# Patient Record
Sex: FEMALE | Race: WHITE | NOT HISPANIC OR LATINO | URBAN - METROPOLITAN AREA
[De-identification: names, ages, dates, MRNs, and addresses within clinical notes are randomized per-mention and may not be internally consistent; named-entity substitution may affect disease eponyms.]

---

## 2017-08-22 ENCOUNTER — OUTPATIENT (OUTPATIENT)
Dept: OUTPATIENT SERVICES | Facility: HOSPITAL | Age: 52
LOS: 1 days | End: 2017-08-22
Payer: COMMERCIAL

## 2017-08-22 VITALS
WEIGHT: 123.9 LBS | DIASTOLIC BLOOD PRESSURE: 74 MMHG | HEART RATE: 64 BPM | RESPIRATION RATE: 16 BRPM | SYSTOLIC BLOOD PRESSURE: 124 MMHG | TEMPERATURE: 98 F

## 2017-08-22 DIAGNOSIS — Z98.890 OTHER SPECIFIED POSTPROCEDURAL STATES: Chronic | ICD-10-CM

## 2017-08-22 DIAGNOSIS — Z01.818 ENCOUNTER FOR OTHER PREPROCEDURAL EXAMINATION: ICD-10-CM

## 2017-08-22 DIAGNOSIS — Z98.82 BREAST IMPLANT STATUS: Chronic | ICD-10-CM

## 2017-08-22 DIAGNOSIS — K40.90 UNILATERAL INGUINAL HERNIA, WITHOUT OBSTRUCTION OR GANGRENE, NOT SPECIFIED AS RECURRENT: ICD-10-CM

## 2017-08-22 LAB
ALBUMIN SERPL ELPH-MCNC: 3.8 G/DL — SIGNIFICANT CHANGE UP (ref 3.3–5)
ALP SERPL-CCNC: 50 U/L — SIGNIFICANT CHANGE UP (ref 40–120)
ALT FLD-CCNC: 21 U/L — SIGNIFICANT CHANGE UP (ref 12–78)
ANION GAP SERPL CALC-SCNC: 9 MMOL/L — SIGNIFICANT CHANGE UP (ref 5–17)
AST SERPL-CCNC: 16 U/L — SIGNIFICANT CHANGE UP (ref 15–37)
BILIRUB SERPL-MCNC: 0.6 MG/DL — SIGNIFICANT CHANGE UP (ref 0.2–1.2)
BUN SERPL-MCNC: 16 MG/DL — SIGNIFICANT CHANGE UP (ref 7–23)
CALCIUM SERPL-MCNC: 8.7 MG/DL — SIGNIFICANT CHANGE UP (ref 8.5–10.1)
CHLORIDE SERPL-SCNC: 107 MMOL/L — SIGNIFICANT CHANGE UP (ref 96–108)
CO2 SERPL-SCNC: 24 MMOL/L — SIGNIFICANT CHANGE UP (ref 22–31)
CREAT SERPL-MCNC: 0.8 MG/DL — SIGNIFICANT CHANGE UP (ref 0.5–1.3)
GLUCOSE SERPL-MCNC: 107 MG/DL — HIGH (ref 70–99)
HCG UR QL: NEGATIVE — SIGNIFICANT CHANGE UP
HCT VFR BLD CALC: 39.6 % — SIGNIFICANT CHANGE UP (ref 34.5–45)
HGB BLD-MCNC: 12.9 G/DL — SIGNIFICANT CHANGE UP (ref 11.5–15.5)
MCHC RBC-ENTMCNC: 29.9 PG — SIGNIFICANT CHANGE UP (ref 27–34)
MCHC RBC-ENTMCNC: 32.6 GM/DL — SIGNIFICANT CHANGE UP (ref 32–36)
MCV RBC AUTO: 91.8 FL — SIGNIFICANT CHANGE UP (ref 80–100)
PLATELET # BLD AUTO: 255 K/UL — SIGNIFICANT CHANGE UP (ref 150–400)
POTASSIUM SERPL-MCNC: 3.7 MMOL/L — SIGNIFICANT CHANGE UP (ref 3.5–5.3)
POTASSIUM SERPL-SCNC: 3.7 MMOL/L — SIGNIFICANT CHANGE UP (ref 3.5–5.3)
PROT SERPL-MCNC: 6.9 G/DL — SIGNIFICANT CHANGE UP (ref 6–8.3)
RBC # BLD: 4.32 M/UL — SIGNIFICANT CHANGE UP (ref 3.8–5.2)
RBC # FLD: 12.5 % — SIGNIFICANT CHANGE UP (ref 10.3–14.5)
SODIUM SERPL-SCNC: 140 MMOL/L — SIGNIFICANT CHANGE UP (ref 135–145)
WBC # BLD: 6.2 K/UL — SIGNIFICANT CHANGE UP (ref 3.8–10.5)
WBC # FLD AUTO: 6.2 K/UL — SIGNIFICANT CHANGE UP (ref 3.8–10.5)

## 2017-08-22 PROCEDURE — G0463: CPT

## 2017-08-22 PROCEDURE — 36415 COLL VENOUS BLD VENIPUNCTURE: CPT

## 2017-08-22 PROCEDURE — 80053 COMPREHEN METABOLIC PANEL: CPT

## 2017-08-22 PROCEDURE — 85027 COMPLETE CBC AUTOMATED: CPT

## 2017-08-22 PROCEDURE — 81025 URINE PREGNANCY TEST: CPT

## 2017-08-22 PROCEDURE — 81001 URINALYSIS AUTO W/SCOPE: CPT

## 2017-08-22 PROCEDURE — 86850 RBC ANTIBODY SCREEN: CPT

## 2017-08-22 PROCEDURE — 86900 BLOOD TYPING SEROLOGIC ABO: CPT

## 2017-08-22 PROCEDURE — 86901 BLOOD TYPING SEROLOGIC RH(D): CPT

## 2017-08-22 NOTE — H&P PST ADULT - PMH
Asthma    Essential tremor  (Right hand, on Topiramate)  GERD (gastroesophageal reflux disease)    Seasonal allergies    Unilateral inguinal hernia

## 2017-08-22 NOTE — H&P PST ADULT - LYMPHATIC
supraclavicular R/anterior cervical L/posterior cervical L/anterior cervical R/posterior cervical R/supraclavicular L

## 2017-08-22 NOTE — H&P PST ADULT - NSANTHOSAYNRD_GEN_A_CORE
No. DERIAN screening performed.  STOP BANG Legend: 0-2 = LOW Risk; 3-4 = INTERMEDIATE Risk; 5-8 = HIGH Risk

## 2017-08-22 NOTE — H&P PST ADULT - GASTROINTESTINAL DETAILS
nontender/no masses palpable/normal/bowel sounds normal/no organomegaly/no distention/soft/no guarding/no rigidity/no rebound tenderness/no bruit

## 2017-08-22 NOTE — H&P PST ADULT - HISTORY OF PRESENT ILLNESS
51yo female with PMH of asthma here for PST. Pt reports to noticing "lump to right groin and it has increased in size". Pt diagnosed with right inguinal hernia. Pt denies n/v/d and abdominal pain. Pt electing for laparoscopic right inguinal hernia repair on 8/24/17.

## 2017-08-22 NOTE — H&P PST ADULT - RS GEN PE MLT RESP DETAILS PC
airway patent/good air movement/clear to auscultation bilaterally/normal/respirations non-labored/breath sounds equal

## 2017-08-22 NOTE — H&P PST ADULT - PSH
S/P bilateral breast implants  (Saline implants, )  S/P dilatation and curettage  (Missed )  S/P hernia repair  (Umbilical hernia reapir, ) S/P bilateral breast implants  (Saline implants, )  S/P dilatation and curettage  (Missed  at 20 weeks)  S/P hernia repair  (Umbilical hernia reapir, )

## 2017-08-22 NOTE — H&P PST ADULT - NEGATIVE CARDIOVASCULAR SYMPTOMS
no peripheral edema/no palpitations/no paroxysmal nocturnal dyspnea/no claudication/no chest pain/no dyspnea on exertion/no orthopnea

## 2017-08-23 RX ORDER — SODIUM CHLORIDE 9 MG/ML
1000 INJECTION, SOLUTION INTRAVENOUS
Qty: 0 | Refills: 0 | Status: DISCONTINUED | OUTPATIENT
Start: 2017-08-24 | End: 2017-08-24

## 2017-08-24 ENCOUNTER — OUTPATIENT (OUTPATIENT)
Dept: OUTPATIENT SERVICES | Facility: HOSPITAL | Age: 52
LOS: 1 days | Discharge: HOME | End: 2017-08-24
Payer: COMMERCIAL

## 2017-08-24 ENCOUNTER — TRANSCRIPTION ENCOUNTER (OUTPATIENT)
Age: 52
End: 2017-08-24

## 2017-08-24 VITALS
HEIGHT: 62 IN | RESPIRATION RATE: 14 BRPM | TEMPERATURE: 98 F | HEART RATE: 67 BPM | WEIGHT: 123.9 LBS | DIASTOLIC BLOOD PRESSURE: 78 MMHG | SYSTOLIC BLOOD PRESSURE: 135 MMHG

## 2017-08-24 VITALS
RESPIRATION RATE: 13 BRPM | OXYGEN SATURATION: 97 % | SYSTOLIC BLOOD PRESSURE: 113 MMHG | DIASTOLIC BLOOD PRESSURE: 52 MMHG

## 2017-08-24 DIAGNOSIS — Z98.890 OTHER SPECIFIED POSTPROCEDURAL STATES: Chronic | ICD-10-CM

## 2017-08-24 DIAGNOSIS — K40.90 UNILATERAL INGUINAL HERNIA, WITHOUT OBSTRUCTION OR GANGRENE, NOT SPECIFIED AS RECURRENT: ICD-10-CM

## 2017-08-24 DIAGNOSIS — Z98.82 BREAST IMPLANT STATUS: Chronic | ICD-10-CM

## 2017-08-24 DIAGNOSIS — Z01.818 ENCOUNTER FOR OTHER PREPROCEDURAL EXAMINATION: ICD-10-CM

## 2017-08-24 PROCEDURE — C1781: CPT

## 2017-08-24 PROCEDURE — 49650 LAP ING HERNIA REPAIR INIT: CPT | Mod: RT

## 2017-08-24 RX ORDER — SODIUM CHLORIDE 9 MG/ML
1000 INJECTION, SOLUTION INTRAVENOUS
Qty: 0 | Refills: 0 | Status: DISCONTINUED | OUTPATIENT
Start: 2017-08-24 | End: 2017-08-24

## 2017-08-24 RX ORDER — FLUTICASONE PROPIONATE 50 MCG
1 SPRAY, SUSPENSION NASAL
Qty: 0 | Refills: 0 | COMMUNITY

## 2017-08-24 RX ORDER — CEFAZOLIN SODIUM 1 G
2000 VIAL (EA) INJECTION ONCE
Qty: 0 | Refills: 0 | Status: COMPLETED | OUTPATIENT
Start: 2017-08-24 | End: 2017-08-24

## 2017-08-24 RX ORDER — OXYCODONE HYDROCHLORIDE 5 MG/1
10 TABLET ORAL EVERY 6 HOURS
Qty: 0 | Refills: 0 | Status: DISCONTINUED | OUTPATIENT
Start: 2017-08-24 | End: 2017-08-24

## 2017-08-24 RX ORDER — CEPHALEXIN 500 MG
1 CAPSULE ORAL
Qty: 2 | Refills: 0 | OUTPATIENT
Start: 2017-08-24

## 2017-08-24 RX ORDER — OXYCODONE HYDROCHLORIDE 5 MG/1
5 TABLET ORAL EVERY 4 HOURS
Qty: 0 | Refills: 0 | Status: DISCONTINUED | OUTPATIENT
Start: 2017-08-24 | End: 2017-08-24

## 2017-08-24 RX ORDER — HYDROMORPHONE HYDROCHLORIDE 2 MG/ML
0.5 INJECTION INTRAMUSCULAR; INTRAVENOUS; SUBCUTANEOUS
Qty: 0 | Refills: 0 | Status: DISCONTINUED | OUTPATIENT
Start: 2017-08-24 | End: 2017-08-24

## 2017-08-24 RX ORDER — IBUPROFEN 200 MG
1 TABLET ORAL
Qty: 0 | Refills: 0 | COMMUNITY

## 2017-08-24 RX ORDER — FEXOFENADINE HCL 30 MG
1 TABLET ORAL
Qty: 0 | Refills: 0 | COMMUNITY

## 2017-08-24 RX ORDER — AZELASTINE 137 UG/1
2 SPRAY, METERED NASAL
Qty: 0 | Refills: 0 | COMMUNITY

## 2017-08-24 RX ORDER — OMEPRAZOLE 10 MG/1
1 CAPSULE, DELAYED RELEASE ORAL
Qty: 0 | Refills: 0 | COMMUNITY

## 2017-08-24 RX ORDER — MONTELUKAST 4 MG/1
1 TABLET, CHEWABLE ORAL
Qty: 0 | Refills: 0 | COMMUNITY

## 2017-08-24 RX ORDER — TOPIRAMATE 25 MG
0 TABLET ORAL
Qty: 0 | Refills: 0 | COMMUNITY

## 2017-08-24 RX ORDER — ONDANSETRON 8 MG/1
1 TABLET, FILM COATED ORAL
Qty: 30 | Refills: 0 | OUTPATIENT
Start: 2017-08-24

## 2017-08-24 RX ORDER — ONDANSETRON 8 MG/1
4 TABLET, FILM COATED ORAL ONCE
Qty: 0 | Refills: 0 | Status: DISCONTINUED | OUTPATIENT
Start: 2017-08-24 | End: 2017-08-24

## 2017-08-24 RX ORDER — GLUCOSAMINE/MSM/CHONDROITIN A 750-375MG
0 TABLET ORAL
Qty: 0 | Refills: 0 | COMMUNITY

## 2017-08-24 RX ORDER — DOCUSATE SODIUM 100 MG
1 CAPSULE ORAL
Qty: 20 | Refills: 0 | OUTPATIENT
Start: 2017-08-24

## 2017-08-24 RX ADMIN — SODIUM CHLORIDE 75 MILLILITER(S): 9 INJECTION, SOLUTION INTRAVENOUS at 14:23

## 2017-08-24 RX ADMIN — HYDROMORPHONE HYDROCHLORIDE 0.5 MILLIGRAM(S): 2 INJECTION INTRAMUSCULAR; INTRAVENOUS; SUBCUTANEOUS at 14:43

## 2017-08-24 RX ADMIN — HYDROMORPHONE HYDROCHLORIDE 0.5 MILLIGRAM(S): 2 INJECTION INTRAMUSCULAR; INTRAVENOUS; SUBCUTANEOUS at 14:22

## 2017-08-24 RX ADMIN — HYDROMORPHONE HYDROCHLORIDE 0.5 MILLIGRAM(S): 2 INJECTION INTRAMUSCULAR; INTRAVENOUS; SUBCUTANEOUS at 15:00

## 2017-08-24 RX ADMIN — SODIUM CHLORIDE 50 MILLILITER(S): 9 INJECTION, SOLUTION INTRAVENOUS at 10:44

## 2017-08-24 NOTE — ASU PATIENT PROFILE, ADULT - PSH
S/P bilateral breast implants  (Saline implants, )  S/P dilatation and curettage  (Missed  at 20 weeks)  S/P hernia repair  (Umbilical hernia reapir, )

## 2017-08-24 NOTE — ASU DISCHARGE PLAN (ADULT/PEDIATRIC). - SPECIAL INSTRUCTIONS
The Patient was given access to the following documents on Aug 24, 2017     PREVENTING INFECTIONS  - Ambulatory Care,  English  The Patient was given access to the following documents on Aug 24, 2017     Oxycodone/Acetaminophen (Oral) (Capsule, Liquid, Tablet, Tablet, Extended Release)  - DrugNote,  English   Oxycodone/Acetaminophen (Oral) (Capsule, Liquid, Tablet, Tablet, Extended Release)  - Med Essential Fact Sheet,  English   The Patient was given access to the following documents on Aug 24, 2017     Laxative, Stool Softeners (Oral) (Capsule, Tablet, Liquid, Capsule, Liquid Filled)  - DrugNote,  English   Laxative, Stool Softeners (Oral) (Capsule, Tablet, Liquid, Capsule, Liquid Filled)  - Med Essential Fact Sheet,  English   The Patient was given access to the following documents on Aug 24, 2017     Cephalexin (Oral) (Capsule, Tablet, Tablet for Suspension, Liquid)  - DrugNote,  English   Cephalexin (Oral) (Capsule, Tablet, Tablet for Suspension, Liquid)  - Med Essential Fact Sheet,  English   The Patient was given access to the following documents on Aug 24, 2017     INGUINAL HERNIA REPAIR  - Discharge Care,  English

## 2017-08-24 NOTE — ASU DISCHARGE PLAN (ADULT/PEDIATRIC). - MEDICATION SUMMARY - MEDICATIONS TO TAKE
I will START or STAY ON the medications listed below when I get home from the hospital:    oxyCODONE-acetaminophen 10 mg-325 mg oral tablet  -- 1 tab(s) by mouth every 6 hours MDD:4  -- Caution federal law prohibits the transfer of this drug to any person other  than the person for whom it was prescribed.  May cause drowsiness.  Alcohol may intensify this effect.  Use care when operating dangerous machinery.  This prescription cannot be refilled.  This product contains acetaminophen.  Do not use  with any other product containing acetaminophen to prevent possible liver damage.  Using more of this medication than prescribed may cause serious breathing problems.    -- Indication: For pain control    topiramate 25 mg oral capsule  --  by mouth 4 times a day (50mg at 6am, 25mg at 12 noon & 25mg at bedtime)  -- Indication: For home med    Zofran ODT 8 mg oral tablet, disintegrating  -- 1 tab(s) by mouth every 6 hours  -- Indication: For nausea    Allegra 180 mg oral tablet  -- 1 tab(s) by mouth once a day  -- Indication: For home med    Mucinex Allergy 180 mg oral tablet  -- 1 tab(s) by mouth once a day  -- Indication: For home med    Keflex 500 mg oral capsule  -- 1 cap(s) by mouth 2 times a day  -- Finish all this medication unless otherwise directed by prescriber.    -- Indication: For antibiotic    Colace 100 mg oral capsule  -- 1 cap(s) by mouth 3 times a day  -- Medication should be taken with plenty of water.    -- Indication: For constipation    montelukast 10 mg oral tablet  -- 1 tab(s) by mouth once a day  -- Indication: For home med    Flonase 50 mcg/inh nasal spray  -- 1 spray(s) into nose once a day  -- Indication: For home med    azelastine 137 mcg/inh (0.1%) nasal spray  -- 2 spray(s) into nose 2 times a day  -- Indication: For home med    Glucosamine & Chondroitin with MSM  --   once a day  -- Indication: For home med    omeprazole 40 mg oral delayed release capsule  -- 1 cap(s) by mouth once a day  -- Indication: For home med

## 2017-08-27 DIAGNOSIS — K40.90 UNILATERAL INGUINAL HERNIA, WITHOUT OBSTRUCTION OR GANGRENE, NOT SPECIFIED AS RECURRENT: ICD-10-CM

## 2017-08-27 DIAGNOSIS — Z88.3 ALLERGY STATUS TO OTHER ANTI-INFECTIVE AGENTS: ICD-10-CM

## 2017-08-27 DIAGNOSIS — J45.909 UNSPECIFIED ASTHMA, UNCOMPLICATED: ICD-10-CM

## 2018-07-16 PROBLEM — G25.0 ESSENTIAL TREMOR: Chronic | Status: ACTIVE | Noted: 2017-08-22

## 2019-07-24 ENCOUNTER — RESULT REVIEW (OUTPATIENT)
Age: 54
End: 2019-07-24

## 2020-10-06 ENCOUNTER — RESULT REVIEW (OUTPATIENT)
Age: 55
End: 2020-10-06

## 2022-10-29 ENCOUNTER — EMERGENCY (EMERGENCY)
Facility: HOSPITAL | Age: 57
LOS: 1 days | Discharge: HOME | End: 2022-10-29
Attending: EMERGENCY MEDICINE | Admitting: EMERGENCY MEDICINE
Payer: COMMERCIAL

## 2022-10-29 VITALS
TEMPERATURE: 98 F | HEIGHT: 62 IN | DIASTOLIC BLOOD PRESSURE: 66 MMHG | RESPIRATION RATE: 16 BRPM | HEART RATE: 85 BPM | OXYGEN SATURATION: 98 % | WEIGHT: 138.01 LBS | SYSTOLIC BLOOD PRESSURE: 111 MMHG

## 2022-10-29 VITALS
DIASTOLIC BLOOD PRESSURE: 64 MMHG | SYSTOLIC BLOOD PRESSURE: 122 MMHG | OXYGEN SATURATION: 98 % | TEMPERATURE: 98 F | RESPIRATION RATE: 16 BRPM | HEART RATE: 85 BPM

## 2022-10-29 DIAGNOSIS — Z98.890 OTHER SPECIFIED POSTPROCEDURAL STATES: Chronic | ICD-10-CM

## 2022-10-29 DIAGNOSIS — Z98.82 BREAST IMPLANT STATUS: Chronic | ICD-10-CM

## 2022-10-29 PROBLEM — J45.909 UNSPECIFIED ASTHMA, UNCOMPLICATED: Chronic | Status: ACTIVE | Noted: 2017-08-22

## 2022-10-29 PROBLEM — K40.90 UNILATERAL INGUINAL HERNIA, WITHOUT OBSTRUCTION OR GANGRENE, NOT SPECIFIED AS RECURRENT: Chronic | Status: ACTIVE | Noted: 2017-08-22

## 2022-10-29 PROBLEM — J30.2 OTHER SEASONAL ALLERGIC RHINITIS: Chronic | Status: ACTIVE | Noted: 2017-08-22

## 2022-10-29 PROBLEM — K21.9 GASTRO-ESOPHAGEAL REFLUX DISEASE WITHOUT ESOPHAGITIS: Chronic | Status: ACTIVE | Noted: 2017-08-22

## 2022-10-29 LAB
ALBUMIN SERPL ELPH-MCNC: 3.6 G/DL — SIGNIFICANT CHANGE UP (ref 3.3–5)
ALP SERPL-CCNC: 81 U/L — SIGNIFICANT CHANGE UP (ref 40–120)
ALT FLD-CCNC: 26 U/L — SIGNIFICANT CHANGE UP (ref 12–78)
ANION GAP SERPL CALC-SCNC: 8 MMOL/L — SIGNIFICANT CHANGE UP (ref 5–17)
APPEARANCE UR: CLEAR — SIGNIFICANT CHANGE UP
AST SERPL-CCNC: 20 U/L — SIGNIFICANT CHANGE UP (ref 15–37)
BASOPHILS # BLD AUTO: 0.04 K/UL — SIGNIFICANT CHANGE UP (ref 0–0.2)
BASOPHILS NFR BLD AUTO: 0.3 % — SIGNIFICANT CHANGE UP (ref 0–2)
BILIRUB SERPL-MCNC: 0.4 MG/DL — SIGNIFICANT CHANGE UP (ref 0.2–1.2)
BILIRUB UR-MCNC: NEGATIVE — SIGNIFICANT CHANGE UP
BUN SERPL-MCNC: 10 MG/DL — SIGNIFICANT CHANGE UP (ref 7–23)
CALCIUM SERPL-MCNC: 9.2 MG/DL — SIGNIFICANT CHANGE UP (ref 8.5–10.1)
CHLORIDE SERPL-SCNC: 102 MMOL/L — SIGNIFICANT CHANGE UP (ref 96–108)
CO2 SERPL-SCNC: 26 MMOL/L — SIGNIFICANT CHANGE UP (ref 22–31)
COLOR SPEC: SIGNIFICANT CHANGE UP
CREAT SERPL-MCNC: 0.77 MG/DL — SIGNIFICANT CHANGE UP (ref 0.5–1.3)
DIFF PNL FLD: ABNORMAL
EGFR: 91 ML/MIN/1.73M2 — SIGNIFICANT CHANGE UP
EOSINOPHIL # BLD AUTO: 0.04 K/UL — SIGNIFICANT CHANGE UP (ref 0–0.5)
EOSINOPHIL NFR BLD AUTO: 0.3 % — SIGNIFICANT CHANGE UP (ref 0–6)
GLUCOSE SERPL-MCNC: 148 MG/DL — HIGH (ref 70–99)
GLUCOSE UR QL: NEGATIVE — SIGNIFICANT CHANGE UP
HCG SERPL-ACNC: 1 MIU/ML — SIGNIFICANT CHANGE UP
HCT VFR BLD CALC: 39 % — SIGNIFICANT CHANGE UP (ref 34.5–45)
HGB BLD-MCNC: 13 G/DL — SIGNIFICANT CHANGE UP (ref 11.5–15.5)
IMM GRANULOCYTES NFR BLD AUTO: 0.2 % — SIGNIFICANT CHANGE UP (ref 0–0.9)
KETONES UR-MCNC: NEGATIVE — SIGNIFICANT CHANGE UP
LEUKOCYTE ESTERASE UR-ACNC: NEGATIVE — SIGNIFICANT CHANGE UP
LIDOCAIN IGE QN: 130 U/L — SIGNIFICANT CHANGE UP (ref 73–393)
LYMPHOCYTES # BLD AUTO: 1.72 K/UL — SIGNIFICANT CHANGE UP (ref 1–3.3)
LYMPHOCYTES # BLD AUTO: 14.2 % — SIGNIFICANT CHANGE UP (ref 13–44)
MCHC RBC-ENTMCNC: 29.3 PG — SIGNIFICANT CHANGE UP (ref 27–34)
MCHC RBC-ENTMCNC: 33.3 GM/DL — SIGNIFICANT CHANGE UP (ref 32–36)
MCV RBC AUTO: 88 FL — SIGNIFICANT CHANGE UP (ref 80–100)
MONOCYTES # BLD AUTO: 1.11 K/UL — HIGH (ref 0–0.9)
MONOCYTES NFR BLD AUTO: 9.1 % — SIGNIFICANT CHANGE UP (ref 2–14)
NEUTROPHILS # BLD AUTO: 9.22 K/UL — HIGH (ref 1.8–7.4)
NEUTROPHILS NFR BLD AUTO: 75.9 % — SIGNIFICANT CHANGE UP (ref 43–77)
NITRITE UR-MCNC: NEGATIVE — SIGNIFICANT CHANGE UP
NRBC # BLD: 0 /100 WBCS — SIGNIFICANT CHANGE UP (ref 0–0)
PH UR: 8 — SIGNIFICANT CHANGE UP (ref 5–8)
PLATELET # BLD AUTO: 264 K/UL — SIGNIFICANT CHANGE UP (ref 150–400)
POTASSIUM SERPL-MCNC: 3.8 MMOL/L — SIGNIFICANT CHANGE UP (ref 3.5–5.3)
POTASSIUM SERPL-SCNC: 3.8 MMOL/L — SIGNIFICANT CHANGE UP (ref 3.5–5.3)
PROT SERPL-MCNC: 7.8 G/DL — SIGNIFICANT CHANGE UP (ref 6–8.3)
PROT UR-MCNC: NEGATIVE — SIGNIFICANT CHANGE UP
RBC # BLD: 4.43 M/UL — SIGNIFICANT CHANGE UP (ref 3.8–5.2)
RBC # FLD: 12.5 % — SIGNIFICANT CHANGE UP (ref 10.3–14.5)
SODIUM SERPL-SCNC: 136 MMOL/L — SIGNIFICANT CHANGE UP (ref 135–145)
SP GR SPEC: 1.01 — SIGNIFICANT CHANGE UP (ref 1.01–1.02)
UROBILINOGEN FLD QL: NEGATIVE — SIGNIFICANT CHANGE UP
WBC # BLD: 12.15 K/UL — HIGH (ref 3.8–10.5)
WBC # FLD AUTO: 12.15 K/UL — HIGH (ref 3.8–10.5)

## 2022-10-29 PROCEDURE — 96374 THER/PROPH/DIAG INJ IV PUSH: CPT

## 2022-10-29 PROCEDURE — 80053 COMPREHEN METABOLIC PANEL: CPT

## 2022-10-29 PROCEDURE — 84702 CHORIONIC GONADOTROPIN TEST: CPT

## 2022-10-29 PROCEDURE — 99284 EMERGENCY DEPT VISIT MOD MDM: CPT

## 2022-10-29 PROCEDURE — 85025 COMPLETE CBC W/AUTO DIFF WBC: CPT

## 2022-10-29 PROCEDURE — 81001 URINALYSIS AUTO W/SCOPE: CPT

## 2022-10-29 PROCEDURE — 83690 ASSAY OF LIPASE: CPT

## 2022-10-29 PROCEDURE — 36415 COLL VENOUS BLD VENIPUNCTURE: CPT

## 2022-10-29 PROCEDURE — 99284 EMERGENCY DEPT VISIT MOD MDM: CPT | Mod: 25

## 2022-10-29 RX ORDER — SODIUM CHLORIDE 9 MG/ML
1000 INJECTION INTRAMUSCULAR; INTRAVENOUS; SUBCUTANEOUS ONCE
Refills: 0 | Status: COMPLETED | OUTPATIENT
Start: 2022-10-29 | End: 2022-10-29

## 2022-10-29 RX ORDER — METRONIDAZOLE 500 MG
1 TABLET ORAL
Qty: 21 | Refills: 0
Start: 2022-10-29 | End: 2022-11-04

## 2022-10-29 RX ORDER — ACETAMINOPHEN 500 MG
1000 TABLET ORAL ONCE
Refills: 0 | Status: COMPLETED | OUTPATIENT
Start: 2022-10-29 | End: 2022-10-29

## 2022-10-29 RX ORDER — MOXIFLOXACIN HYDROCHLORIDE TABLETS, 400 MG 400 MG/1
1 TABLET, FILM COATED ORAL
Qty: 14 | Refills: 0
Start: 2022-10-29 | End: 2022-11-04

## 2022-10-29 RX ADMIN — Medication 400 MILLIGRAM(S): at 13:09

## 2022-10-29 RX ADMIN — SODIUM CHLORIDE 1000 MILLILITER(S): 9 INJECTION INTRAMUSCULAR; INTRAVENOUS; SUBCUTANEOUS at 13:09

## 2022-10-29 NOTE — ED ADULT TRIAGE NOTE - CHIEF COMPLAINT QUOTE
Patient is a 56yo female complaining of left lower abdominal pain and left flank with difficulty urinating x 8 days patient was told to come to the ED by urgent care patient had cat scan results that was done on Wednesday

## 2022-10-29 NOTE — ED PROVIDER NOTE - PATIENT PORTAL LINK FT
You can access the FollowMyHealth Patient Portal offered by [facility] by registering at the following website: [email or web address]. By joining [email or web address] FollowMyHealth portal, you will also be able to view your health information using other applications (apps) compatible with our system.

## 2022-10-29 NOTE — ED ADULT NURSE NOTE - OBJECTIVE STATEMENT
Pt a/ox4, reports abdominal pain at home and at triage, pain has improved, Pt able to ambulate without assist, reports no pain at this time.

## 2022-10-29 NOTE — ED PROVIDER NOTE - NS ED ATTENDING STATEMENT MOD
This was a shared visit with the SONA. I reviewed and verified the documentation and independently performed the documented:

## 2022-10-29 NOTE — ED PROVIDER NOTE - NSICDXPASTSURGICALHX_GEN_ALL_CORE_FT
PAST SURGICAL HISTORY:  S/P bilateral breast implants (Saline implants, 2008)    S/P dilatation and curettage (Missed  at 20 weeks)    S/P hernia repair (Umbilical hernia reapir, )

## 2022-10-29 NOTE — ED PROVIDER NOTE - CARE PROVIDER_API CALL
Gerardo Smalls ()  Internal Medicine  [address] [place] [address]  Phone: [phone]  Fax: [phone]  Follow Up Time: 1-3 Days

## 2022-10-29 NOTE — ED PROVIDER NOTE - NSFOLLOWUPINSTRUCTIONS_ED_ALL_ED_FT
Follow up with your primary care doctor and Gastroenterology. Cipro and Flagyl was sent to your pharmacy. Discontinue Amoxicillin. Return for worsening pain, fever, vomiting, bloody stool.     Abdominal Pain, Adult      Pain in the abdomen (abdominal pain) can be caused by many things. Often, abdominal pain is not serious and it gets better with no treatment or by being treated at home. However, sometimes abdominal pain is serious.    Your health care provider will ask questions about your medical history and do a physical exam to try to determine the cause of your abdominal pain.      Follow these instructions at home:    Medicines     •Take over-the-counter and prescription medicines only as told by your health care provider.      • Do not take a laxative unless told by your health care provider.        General instructions      •Watch your condition for any changes.      •Drink enough fluid to keep your urine pale yellow.      •Keep all follow-up visits as told by your health care provider. This is important.        Contact a health care provider if:    •Your abdominal pain changes or gets worse.      •You are not hungry or you lose weight without trying.      •You are constipated or have diarrhea for more than 2–3 days.      •You have pain when you urinate or have a bowel movement.      •Your abdominal pain wakes you up at night.      •Your pain gets worse with meals, after eating, or with certain foods.      •You are vomiting and cannot keep anything down.      •You have a fever.      •You have blood in your urine.        Get help right away if:    •Your pain does not go away as soon as your health care provider told you to expect.      •You cannot stop vomiting.      •Your pain is only in areas of the abdomen, such as the right side or the left lower portion of the abdomen. Pain on the right side could be caused by appendicitis.      •You have bloody or black stools, or stools that look like tar.      •You have severe pain, cramping, or bloating in your abdomen.    •You have signs of dehydration, such as:  •Dark urine, very little urine, or no urine.      •Cracked lips.      •Dry mouth.      •Sunken eyes.      •Sleepiness.      •Weakness.        •You have trouble breathing or chest pain.        Summary    •Often, abdominal pain is not serious and it gets better with no treatment or by being treated at home. However, sometimes abdominal pain is serious.      •Watch your condition for any changes.      •Take over-the-counter and prescription medicines only as told by your health care provider.      •Contact a health care provider if your abdominal pain changes or gets worse.      •Get help right away if you have severe pain, cramping, or bloating in your abdomen.      This information is not intended to replace advice given to you by your health care provider. Make sure you discuss any questions you have with your health care provider.

## 2022-10-29 NOTE — ED ADULT NURSE NOTE - TEMPLATE LIST FOR HEAD TO TOE ASSESSMENT
Chief Complaint   Patient presents with    Medication Evaluation     Visit Vitals  /72   Pulse 107   Temp 98.6 °F (37 °C) (Oral)   Ht (!) 4' 3.38\" (1.305 m)   Wt 55 lb 3.2 oz (25 kg)   SpO2 98%   BMI 14.70 kg/m²     1. Have you been to the ER, urgent care clinic since your last visit? Hospitalized since your last visit? no    2. Have you seen or consulted any other health care providers outside of the [address] [place] [address] since your last visit? Include any pap smears or colon screening.   no Abdominal Pain, N/V/D

## 2022-10-29 NOTE — ED PROVIDER NOTE - CLINICAL SUMMARY MEDICAL DECISION MAKING FREE TEXT BOX
57-year-old female with no significant past medical history presents with left-sided lower abdominal pain/left flank pain over past 8 to 9 days.  Patient had outpatient CT which showed some diverticular disease, was started on antibiotics.  Now patient complains of increased pain over the past 1 to 2 days.  No acute fever or chills.  No vomiting or diarrhea.  No known trauma.  No dysuria/hematuria.  No numbness or Stamicis focal weakness.  No aggravating or alleviating factors otherwise noted.  No other acute complaints at this time.  Exam: Nontoxic, well-appearing.  No acute distress.  CTA BL, no W/R/R.  Normal cardiac exam.  Abdomen soft, mild tenderness to left lower quadrant/left flank.  No rebound or guarding.  Nondistended.  Normal nonfocal neuro exam.  No CVAT.  No C/C/E.  No other acute findings on exam.  Acute left abdominal pain, with slightly worsening symptoms with known diverticular disease on outpatient CT.  Check labs, IV fluids, reeval.  Discussed with patient at length regarding nature of her symptoms, if no acute lab abnormalities i.e. elevated white blood cell count, will change antibiotics and patient will follow-up as an outpatient.  If any other concerns, will check CT.  Shared decision making, patient agrees with this plan.

## 2022-10-29 NOTE — ED PROVIDER NOTE - NSICDXFAMILYHX_GEN_ALL_CORE_FT
FAMILY HISTORY:  Father  Still living? No  Family history of Parkinson's disease, Age at diagnosis: Age Unknown

## 2022-10-29 NOTE — ED PROVIDER NOTE - NSICDXPASTMEDICALHX_GEN_ALL_CORE_FT
PAST MEDICAL HISTORY:  Asthma     Essential tremor (Right hand, on Topiramate)    GERD (gastroesophageal reflux disease)     Seasonal allergies     Unilateral inguinal hernia

## 2022-10-29 NOTE — ED PROVIDER NOTE - PROGRESS NOTE DETAILS
Discussed with patient brother Matthew who is a Gastroenterologist. Offered CT as WBC elevated. Recent CT noted Diverticulosis. Declined CT. Agreed to change abx to Cipro/Flagyl and will return for any worsening condition. All questions were answered. Discussed the importance of prompt, close medical follow-up. Patient will return with any changes, concerns or persistent/worsening symptoms.  Patient verbalized understanding.

## 2022-10-29 NOTE — ED ADULT NURSE NOTE - CHIEF COMPLAINT QUOTE
Patient is a 58yo female complaining of left lower abdominal pain and left flank with difficulty urinating x 8 days patient was told to come to the ED by urgent care patient had cat scan results that was done on Wednesday

## 2022-10-29 NOTE — ED PROVIDER NOTE - PHYSICAL EXAMINATION
Constitutional: Awake, Alert, non-toxic. NAD. Well appearing, well nourished.   HEAD: Normocephalic, atraumatic.   EYES: EOM intact, conjunctiva and sclera are clear bilaterally.   ENT: No rhinorrhea, patent, mucous membranes pink/moist, no drooling or stridor.   NECK: Supple, non-tender  CARDIOVASCULAR: Normal S1, S2; regular rate and rhythm.  RESPIRATORY: Normal respiratory effort; breath sounds CTAB, no wheezes, rhonchi, or rales. Speaking in full sentences. No accessory muscle use.   ABDOMEN: Soft; (+) LLQ and left lateral anterior flank TTP. no CVAT   EXTREMITIES: Full passive and active ROM in all extremities; non-tender to palpation; distal pulses palpable and symmetric  SKIN: Warm, dry; good skin turgor, no apparent lesions or rashes, no ecchymosis, brisk capillary refill.  NEURO: A&O x3. Sensory and motor functions are grossly intact. Speech is normal. Appearance and judgement seem appropriate for gender and age.

## 2022-10-29 NOTE — ED PROVIDER NOTE - OBJECTIVE STATEMENT
58 y/o female without reported PMHx presents today due to left LQ and left flank pain x 8 days. pt reports she saw her PCP for it last Friday in which was given PCN abx and had Ct scan ordered. Reports she had CT 2 days ago without acute findings but in report noted Diverticulosis and bladder wall thickening. pt was given abx for UTI. pt admits to diarrhea. pt denies fever, vomiting, dysuria, hematuria, cp, sob, kidney stone hx, or any other complaints.

## 2023-04-27 NOTE — H&P PST ADULT - MUSCULOSKELETAL
negative Dapsone Counseling: I discussed with the patient the risks of dapsone including but not limited to hemolytic anemia, agranulocytosis, rashes, methemoglobinemia, kidney failure, peripheral neuropathy, headaches, GI upset, and liver toxicity.  Patients who start dapsone require monitoring including baseline LFTs and weekly CBCs for the first month, then every month thereafter.  The patient verbalized understanding of the proper use and possible adverse effects of dapsone.  All of the patient's questions and concerns were addressed. No joint pain, swelling or deformity; no limitation of movement

## 2023-08-02 ENCOUNTER — APPOINTMENT (OUTPATIENT)
Dept: BREAST CENTER | Facility: CLINIC | Age: 58
End: 2023-08-02
Payer: COMMERCIAL

## 2023-08-02 VITALS
DIASTOLIC BLOOD PRESSURE: 80 MMHG | HEART RATE: 72 BPM | BODY MASS INDEX: 25.4 KG/M2 | SYSTOLIC BLOOD PRESSURE: 130 MMHG | HEIGHT: 62 IN | WEIGHT: 138 LBS

## 2023-08-02 DIAGNOSIS — Z13.79 ENCOUNTER FOR OTHER SCREENING FOR GENETIC AND CHROMOSOMAL ANOMALIES: ICD-10-CM

## 2023-08-02 DIAGNOSIS — Z78.9 OTHER SPECIFIED HEALTH STATUS: ICD-10-CM

## 2023-08-02 DIAGNOSIS — N64.4 MASTODYNIA: ICD-10-CM

## 2023-08-02 DIAGNOSIS — Z98.82 BREAST IMPLANT STATUS: ICD-10-CM

## 2023-08-02 DIAGNOSIS — N64.89 OTHER SPECIFIED DISORDERS OF BREAST: ICD-10-CM

## 2023-08-02 PROCEDURE — 99243 OFF/OP CNSLTJ NEW/EST LOW 30: CPT

## 2023-08-02 PROCEDURE — 99203 OFFICE O/P NEW LOW 30 MIN: CPT

## 2023-08-02 RX ORDER — FLUTICASONE PROPIONATE 50 MCG
SPRAY, SUSPENSION NASAL
Refills: 0 | Status: ACTIVE | COMMUNITY

## 2023-08-02 RX ORDER — MULTIVIT/FOLIC ACID/HERBAL 223 400 MCG
TABLET ORAL
Refills: 0 | Status: ACTIVE | COMMUNITY

## 2023-08-02 RX ORDER — PRIMIDONE 50 MG/1
50 TABLET ORAL
Refills: 0 | Status: ACTIVE | COMMUNITY

## 2023-08-02 RX ORDER — AZELASTINE HYDROCHLORIDE 137 UG/1
SPRAY, METERED NASAL
Refills: 0 | Status: ACTIVE | COMMUNITY

## 2023-08-02 RX ORDER — IPRATROPIUM BROMIDE 42 UG/1
SPRAY NASAL
Refills: 0 | Status: ACTIVE | COMMUNITY

## 2023-08-02 RX ORDER — FEXOFENADINE HCL 60 MG
TABLET ORAL
Refills: 0 | Status: ACTIVE | COMMUNITY

## 2023-08-02 RX ORDER — MONTELUKAST SODIUM 10 MG/1
10 TABLET, FILM COATED ORAL
Refills: 0 | Status: ACTIVE | COMMUNITY

## 2023-08-02 NOTE — PAST MEDICAL HISTORY
[Menarche Age ____] : age at menarche was [unfilled] [Total Preg ___] : G[unfilled] [Live Births ___] : P[unfilled]  [Age At Live Birth ___] : Age at live birth: [unfilled] [FreeTextEntry8] : no [FreeTextEntry7] : x 10 years

## 2023-08-02 NOTE — CONSULT LETTER
[Dear  ___] : Dear  [unfilled], [Consult Letter:] : I had the pleasure of evaluating your patient, [unfilled]. [Sincerely,] : Sincerely, [DrColette  ___] : Dr. TRACY

## 2023-08-02 NOTE — DATA REVIEWED
[FreeTextEntry1] : Bilateral mammogram ([facility]) 2/16/2023:  No evidence of malignancy.  Bilateral breast ultrasound 2/16/2023:  Left breast cysts stable.  No evidence of malignancy.  (The images were reviewed on the [facility] portal.  The disc was returned to the patient.)

## 2023-08-02 NOTE — PHYSICAL EXAM
[EOMI] : extra ocular movement intact [Sclera nonicteric] : sclera nonicteric [Supple] : supple [No Supraclavicular Adenopathy] : no supraclavicular adenopathy [No Cervical Adenopathy] : no cervical adenopathy [No Thyromegaly] : no thyromegaly [Clear to Auscultation Bilat] : clear to auscultation bilaterally [Normal Sinus Rhythm] : normal sinus rhythm [Normal S1, S2] : normal S1 and S2 [Examined in the supine and seated position] : examined in the supine and seated position [Asymmetrical] : asymmetrical [No dominant masses] : no dominant masses in right breast  [No dominant masses] : no dominant masses left breast [No Nipple Retraction] : no left nipple retraction [No Nipple Discharge] : no left nipple discharge [No Axillary Lymphadenopathy] : no left axillary lymphadenopathy [Soft] : abdomen soft [Not Tender] : non-tender [de-identified] : Inframammary scar. Implant. [de-identified] : Inframammary scar. Implant. Implant displaces inferiorly in supine position with creasing of lower pole breast skin.

## 2023-08-02 NOTE — HISTORY OF PRESENT ILLNESS
[FreeTextEntry1] : This is a 58 year old  female who has retro glandular saline implants that were placed by Dr. Osborne in 2009.    She wants to have them removed and will be making an appointment with Dr. Horan.  She was told she needed to see a breast surgeon first.   She has had chronic itching and burning of a spot in the outer right breast for many years and previously saw a breast surgeon for it. It sometimes wakes her up at night.

## 2024-01-16 NOTE — ASU DISCHARGE PLAN (ADULT/PEDIATRIC). - NS DC INTERPRETER YES NO
Светлана Anguiano discharged to home accompanied by family member.   Patient provided with the foam with border dressing for dressing changes left knee.  Valuables and belongings sent with patient.   discharge summary, discharge instructions, medications, and follow up appointments reviewed with patient and family member.  Patient and family member verbalized understanding.   No